# Patient Record
Sex: FEMALE | Race: WHITE | NOT HISPANIC OR LATINO | Employment: OTHER
[De-identification: names, ages, dates, MRNs, and addresses within clinical notes are randomized per-mention and may not be internally consistent; named-entity substitution may affect disease eponyms.]

---

## 2022-04-19 ENCOUNTER — TRANSCRIBE ORDERS (OUTPATIENT)
Dept: OTHER | Age: 67
End: 2022-04-19

## 2022-04-19 DIAGNOSIS — M54.50 LOW BACK PAIN, UNSPECIFIED: Primary | ICD-10-CM

## 2022-04-28 ENCOUNTER — THERAPY VISIT (OUTPATIENT)
Dept: PHYSICAL THERAPY | Facility: CLINIC | Age: 67
End: 2022-04-28
Attending: FAMILY MEDICINE
Payer: COMMERCIAL

## 2022-04-28 DIAGNOSIS — G89.29 CHRONIC MIDLINE LOW BACK PAIN WITHOUT SCIATICA: ICD-10-CM

## 2022-04-28 DIAGNOSIS — M54.50 CHRONIC MIDLINE LOW BACK PAIN WITHOUT SCIATICA: ICD-10-CM

## 2022-04-28 PROCEDURE — 97110 THERAPEUTIC EXERCISES: CPT | Mod: GP | Performed by: PHYSICAL THERAPIST

## 2022-04-28 PROCEDURE — 97161 PT EVAL LOW COMPLEX 20 MIN: CPT | Mod: GP | Performed by: PHYSICAL THERAPIST

## 2022-04-28 PROCEDURE — 97112 NEUROMUSCULAR REEDUCATION: CPT | Mod: GP | Performed by: PHYSICAL THERAPIST

## 2022-04-28 NOTE — PROGRESS NOTES
Physical Therapy Initial Evaluation  Subjective:  The history is provided by the patient. No  was used.   Patient Health History  Kaila Jimenez being seen for chronic back pain.     Problem began: 4/19/2022.   Problem occurred: unknown   Pain is reported as 3/10 on pain scale.  General health as reported by patient is fair.  Pertinent medical history includes: diabetes, implanted device and numbness/tingling.   Red flags:  None as reported by patient.  Medical allergies: none.   Surgeries include:  Orthopedic surgery. Other surgery history details: L knee TKA.    Current medications:  Other. Other medications details: diabetes meds; OTC excederine medication; neuropathy.    Current occupation is Retired.      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                Therapist Generated HPI Evaluation  Problem details: Patient presents to PT today with c/o chronic LBP; has been ongoing for many years (~ 3 years ago- [place] [employer]         Type of problem:  Lumbar.    This is a chronic condition.  Condition occurred with:  Insidious onset.  Where condition occurred: for unknown reasons.  Patient reports pain:  Lumbar spine right, lumbar spine left and central lumbar spine.  Pain is described as aching and is constant.  Pain radiates to:  No radiation. Pain is worse during the day.  Since onset symptoms are unchanged.  Associated symptoms:  Loss of motion/stiffness and loss of strength. Symptoms are exacerbated by standing, walking, other and bending (transfers)  and relieved by rest and NSAID's.  Special tests included:  X-ray (DDD?).    Barriers include:  Stairs.      Oswestry Score: 26 %                 Objective:  Standing Alignment:    Cervical/Thoracic:  Forward head  Shoulder/UE:  Rounded shoulders, protracted scapula L and protracted scapula R  Lumbar:  Lordosis decr            Gait:    Gait Type:  Antalgic   Assistive Devices:   None  Deviations:  Shoulder:  Decr arm swing R and decr arm swing LLumbar:  Trunk flexion               Lumbar/SI Evaluation  ROM:    AROM Lumbar:   Flexion:        Ankle pain  Ext:                    Mod loss   Side Bend:        Left:     Right:   Rotation:           Left:     Right:   Side Glide:        Left:     Right:                                                                          General     ROS    Assessment/Plan:    Patient is a 66 year old female with lumbar complaints.    Patient has the following significant findings with corresponding treatment plan.                Diagnosis 1:  LBP  Pain -  hot/cold therapy, US and manual therapy  Impaired muscle performance - neuro re-education  Decreased function - therapeutic activities  Impaired posture - neuro re-education    Therapy Evaluation Codes:   1) History comprised of:   Personal factors that impact the plan of care:      Past/current experiences.    Comorbidity factors that impact the plan of care are:      Diabetes, Implanted device and Numbness/tingling.     Medications impacting care: diabetes medication.  2) Examination of Body Systems comprised of:   Body structures and functions that impact the plan of care:      Lumbar spine.   Activity limitations that impact the plan of care are:      Bending, Standing and Walking.  3) Clinical presentation characteristics are:   Stable/Uncomplicated.  4) Decision-Making    Low complexity using standardized patient assessment instrument and/or measureable assessment of functional outcome.  Cumulative Therapy Evaluation is: Low complexity.    Previous and current functional limitations:  (See Goal Flow Sheet for this information)    Short term and Long term goals: (See Goal Flow Sheet for this information)     Communication ability:  Patient appears to be able to clearly communicate and understand verbal and written communication and follow directions correctly.  Treatment Explanation - The following has  been discussed with the patient:   RX ordered/plan of care  Anticipated outcomes  Possible risks and side effects  This patient would benefit from PT intervention to resume normal activities.   Rehab potential is good.    Frequency:  1 X week, once daily  Duration:  for 12 weeks  Discharge Plan:  Achieve all LTG.  Independent in home treatment program.  Reach maximal therapeutic benefit.    Please refer to the daily flowsheet for treatment today, total treatment time and time spent performing 1:1 timed codes.

## 2022-04-28 NOTE — PROGRESS NOTES
[facility]    OUTPATIENT Physical Therapy ORTHOPEDIC EVALUATION  PLAN OF TREATMENT FOR OUTPATIENT REHABILITATION  (COMPLETE FOR INITIAL CLAIMS ONLY)  Patient's Last Name, First Name, M.I.  YOB: 1955  Kaila Jimenez    Provider s Name:  [facility]   Medical Record No.  [id number]   Start of Care Date:  04/28/22   Onset Date:   04/19/22 (MD referral)   Type:     _X__PT   ___OT Medical Diagnosis:    Encounter Diagnosis   Name Primary?    Chronic midline low back pain without sciatica         Treatment Diagnosis:  LBP        Goals:     04/28/22 0500   Body Part   Goals listed below are for LBP   Goal #1   Goal #1 ambulation   Previous Functional Level No restrictions   Current Functional Level Minutes patient can walk   Performance Level 5-8 min; uses a scooter due to fatigue   STG Target Performance Minutes patient will be able to walk   Performance Level 15 min; no pain   Rationale for safe household ambulation;for safe outdoor household ambulation;for safe community ambulation   Due Date 06/09/22    LTG Target Performance Minutes patient will be able to  walk   Performance Level 30 min; no pain   Rationale for safe household ambulation;for safe outdoor household ambulation;for safe community ambulation;to promote a healthy and active lifestyle   Due Date 07/21/22       Therapy Frequency:  1x/week  Predicted Duration of Therapy Intervention:  x12 weeks    Emilia Jimenez, PT                 I CERTIFY THE NEED FOR THESE SERVICES FURNISHED UNDER        THIS PLAN OF TREATMENT AND WHILE UNDER MY CARE .             Physician Signature               Date    X_____________________________________________________                         Certification Date From:  04/28/22   Certification Date To:  07/21/22    Referring Provider:  Sary Harrington DO    Initial  Assessment        See Epic Evaluation SOC Date: 04/28/22

## 2022-05-05 ENCOUNTER — THERAPY VISIT (OUTPATIENT)
Dept: PHYSICAL THERAPY | Facility: CLINIC | Age: 67
End: 2022-05-05
Payer: COMMERCIAL

## 2022-05-05 DIAGNOSIS — G89.29 CHRONIC MIDLINE LOW BACK PAIN WITHOUT SCIATICA: Primary | ICD-10-CM

## 2022-05-05 DIAGNOSIS — M54.50 CHRONIC MIDLINE LOW BACK PAIN WITHOUT SCIATICA: Primary | ICD-10-CM

## 2022-05-05 PROCEDURE — 97140 MANUAL THERAPY 1/> REGIONS: CPT | Mod: GP | Performed by: PHYSICAL THERAPIST

## 2022-05-05 PROCEDURE — 97110 THERAPEUTIC EXERCISES: CPT | Mod: GP | Performed by: PHYSICAL THERAPIST

## 2022-05-05 PROCEDURE — 97112 NEUROMUSCULAR REEDUCATION: CPT | Mod: GP | Performed by: PHYSICAL THERAPIST

## 2022-05-08 ENCOUNTER — HEALTH MAINTENANCE LETTER (OUTPATIENT)
Age: 67
End: 2022-05-08

## 2022-05-17 ENCOUNTER — THERAPY VISIT (OUTPATIENT)
Dept: PHYSICAL THERAPY | Facility: CLINIC | Age: 67
End: 2022-05-17
Payer: COMMERCIAL

## 2022-05-17 DIAGNOSIS — G89.29 CHRONIC MIDLINE LOW BACK PAIN WITHOUT SCIATICA: Primary | ICD-10-CM

## 2022-05-17 DIAGNOSIS — M54.50 CHRONIC MIDLINE LOW BACK PAIN WITHOUT SCIATICA: Primary | ICD-10-CM

## 2022-05-17 PROCEDURE — 97110 THERAPEUTIC EXERCISES: CPT | Mod: GP | Performed by: PHYSICAL THERAPIST

## 2022-05-17 PROCEDURE — 97112 NEUROMUSCULAR REEDUCATION: CPT | Mod: GP | Performed by: PHYSICAL THERAPIST

## 2022-05-17 PROCEDURE — 97140 MANUAL THERAPY 1/> REGIONS: CPT | Mod: GP | Performed by: PHYSICAL THERAPIST

## 2022-06-02 ENCOUNTER — THERAPY VISIT (OUTPATIENT)
Dept: PHYSICAL THERAPY | Facility: CLINIC | Age: 67
End: 2022-06-02
Payer: COMMERCIAL

## 2022-06-02 DIAGNOSIS — M54.50 CHRONIC MIDLINE LOW BACK PAIN WITHOUT SCIATICA: Primary | ICD-10-CM

## 2022-06-02 DIAGNOSIS — G89.29 CHRONIC MIDLINE LOW BACK PAIN WITHOUT SCIATICA: Primary | ICD-10-CM

## 2022-06-02 PROCEDURE — 97140 MANUAL THERAPY 1/> REGIONS: CPT | Mod: GP | Performed by: PHYSICAL THERAPIST

## 2022-06-02 PROCEDURE — 97112 NEUROMUSCULAR REEDUCATION: CPT | Mod: GP | Performed by: PHYSICAL THERAPIST

## 2022-06-02 PROCEDURE — 97110 THERAPEUTIC EXERCISES: CPT | Mod: GP | Performed by: PHYSICAL THERAPIST

## 2022-06-03 ENCOUNTER — TRANSCRIBE ORDERS (OUTPATIENT)
Dept: OTHER | Age: 67
End: 2022-06-03
Payer: COMMERCIAL

## 2022-06-03 DIAGNOSIS — M54.2 NECK PAIN: Primary | ICD-10-CM

## 2022-06-16 ENCOUNTER — THERAPY VISIT (OUTPATIENT)
Dept: PHYSICAL THERAPY | Facility: CLINIC | Age: 67
End: 2022-06-16
Attending: FAMILY MEDICINE
Payer: COMMERCIAL

## 2022-06-16 DIAGNOSIS — M54.2 NECK PAIN: ICD-10-CM

## 2022-06-16 DIAGNOSIS — M54.50 CHRONIC MIDLINE LOW BACK PAIN WITHOUT SCIATICA: Primary | ICD-10-CM

## 2022-06-16 DIAGNOSIS — G89.29 CHRONIC MIDLINE LOW BACK PAIN WITHOUT SCIATICA: Primary | ICD-10-CM

## 2022-06-16 PROCEDURE — 97161 PT EVAL LOW COMPLEX 20 MIN: CPT | Mod: GP | Performed by: PHYSICAL THERAPIST

## 2022-06-16 PROCEDURE — 97140 MANUAL THERAPY 1/> REGIONS: CPT | Mod: GP | Performed by: PHYSICAL THERAPIST

## 2022-06-16 NOTE — PROGRESS NOTES
Physical Therapy Initial Evaluation  Subjective:  The history is provided by the patient. No  was used.   Patient Health History  Kaila Jimenez being seen for Neck Pain and HA.     Problem began: 6/3/2022.   Problem occurred: has been ongoing for years   Pain is reported as 1/10 on pain scale.  General health as reported by patient is good.  Pertinent medical history includes: diabetes and numbness/tingling.   Red flags:  Pain at rest/night.  Medical allergies: none.   Surgeries include:  Orthopedic surgery.    Current medications:  Pain medication and other. Other medications details: diabetes medication.    Current occupation is retired.                     Therapist Generated HPI Evaluation  Problem details: Presents to PT with c/o ongoing Neck pain and HA.  Recently returned from vacation with c/o HA  .         Type of problem:  Cervical spine.    This is a chronic condition.  Condition occurred with:  Insidious onset.  Where condition occurred: for unknown reasons.  Patient reports pain:  Central cervical spine, cervical left side and cervical right side.  Pain is described as aching and other (throbbing/pounding pain; intensity varies with activity) and is intermittent.  Pain radiates to:  Head, shoulder left and shoulder right. Pain is the same all the time.  Since onset symptoms are gradually improving.  Associated symptoms:  Loss of motion/stiffness. Symptoms are exacerbated by looking up or down, rotating head, sitting and other (bending)  and relieved by heat and other (rubbing the area).  Imaging testing: nothing recently.    Barriers include:  None as reported by patient.                        Objective:  Standing Alignment:    Cervical/Thoracic:  Forward head, Dowager's hump and thoracic kyphosis increased  Shoulder/UE:  Rounded shoulders                                  Cervical/Thoracic Evaluation    AROM:  AROM Cervical:    Flexion:          Chin to chest; pulling back of  neck  Extension:       Mod loss; some loss of balance in standing  Rotation:         Left: mod loss     Right: min loss  Side Bend:      Left:     Right:       Headaches: none  Cervical Myotomes:  normal                        Cervical Palpation:    Tenderness present at Left:    Upper Trap; Levator and Erector Spinae  Tenderness present at Right:    Upper Trap; Levator and Erector Spinae                                                  General     ROS    Assessment/Plan:    Patient is a 66 year old female with cervical complaints.    Patient has the following significant findings with corresponding treatment plan.                Diagnosis 1:  Neck Pain  Pain -  hot/cold therapy, US and manual therapy  Impaired muscle performance - neuro re-education  Decreased function - therapeutic activities  Impaired posture - neuro re-education    Therapy Evaluation Codes:   1) History comprised of:   Personal factors that impact the plan of care:      Past/current experiences.    Comorbidity factors that impact the plan of care are:      Diabetes.     Medications impacting care: Pain.  2) Examination of Body Systems comprised of:   Body structures and functions that impact the plan of care:      Cervical spine.   Activity limitations that impact the plan of care are:      Sitting.  3) Clinical presentation characteristics are:   Stable/Uncomplicated.  4) Decision-Making    Low complexity using standardized patient assessment instrument and/or measureable assessment of functional outcome.  Cumulative Therapy Evaluation is: Low complexity.    Previous and current functional limitations:  (See Goal Flow Sheet for this information)    Short term and Long term goals: (See Goal Flow Sheet for this information)     Communication ability:  Patient appears to be able to clearly communicate and understand verbal and written communication and follow directions correctly.  Treatment Explanation - The following has been discussed with the  patient:   RX ordered/plan of care  Anticipated outcomes  Possible risks and side effects  This patient would benefit from PT intervention to resume normal activities.   Rehab potential is good.    Frequency:  2 X a month, once daily  Duration:  for 3 months  Discharge Plan:  Achieve all LTG.  Independent in home treatment program.  Reach maximal therapeutic benefit.    Please refer to the daily flowsheet for treatment today, total treatment time and time spent performing 1:1 timed codes.

## 2022-06-16 NOTE — PROGRESS NOTES
[facility]    OUTPATIENT Physical Therapy ORTHOPEDIC EVALUATION  PLAN OF TREATMENT FOR OUTPATIENT REHABILITATION  (COMPLETE FOR INITIAL CLAIMS ONLY)  Patient's Last Name, First Name, M.I.  YOB: 1955  Kaila Jimenez    Provider s Name:  [facility]   Medical Record No.  [id number]   Start of Care Date:  06/16/22   Onset Date:   06/03/22 (MD referral)   Type:     _X__PT   ___OT Medical Diagnosis:    Encounter Diagnoses   Name Primary?    Chronic midline low back pain without sciatica Yes    Neck pain         Treatment Diagnosis:  LBP        Goals:     06/16/22 0500   Goal #2   Goal #2 reading/computer work   Previous Functional Level No restrictions   Current Functional Level Minutes patient can read;Minutes patient can work on a computer   Performance level 4/10PL; 30-60'   STG Target Performance Minutes patient will be able to read;Minutes patient will be able to work on a computer   Performance level 2/10PL; 30-60'   Rationale to perform home related tasks ;to return to normal leisure activities   Due date 07/21/22   LTG Target Performance Hours patient will be able to read;Hours patient will be able to work on a computer   Performance Level >1 hour; no pain   Rationale to perform home related tasks;to return to normal leisure activities   Due date 09/16/22       Therapy Frequency:  1x/week  Predicted Duration of Therapy Intervention:  x8 weeks    Emilia Jimenez, PT                 I CERTIFY THE NEED FOR THESE SERVICES FURNISHED UNDER        THIS PLAN OF TREATMENT AND WHILE UNDER MY CARE .             Physician Signature               Date    X_____________________________________________________                         Certification Date From:  06/16/22   Certification Date To:  09/16/22    Referring Provider:  Sary Harrington, DO    Initial Assessment        See  Epic Evaluation SOC Date: 06/16/22

## 2022-07-05 ENCOUNTER — THERAPY VISIT (OUTPATIENT)
Dept: PHYSICAL THERAPY | Facility: CLINIC | Age: 67
End: 2022-07-05
Payer: COMMERCIAL

## 2022-07-05 DIAGNOSIS — M54.50 CHRONIC MIDLINE LOW BACK PAIN WITHOUT SCIATICA: ICD-10-CM

## 2022-07-05 DIAGNOSIS — M54.2 NECK PAIN: Primary | ICD-10-CM

## 2022-07-05 DIAGNOSIS — G89.29 CHRONIC MIDLINE LOW BACK PAIN WITHOUT SCIATICA: ICD-10-CM

## 2022-07-05 PROCEDURE — 97035 APP MDLTY 1+ULTRASOUND EA 15: CPT | Mod: GP | Performed by: PHYSICAL THERAPIST

## 2022-07-05 PROCEDURE — 97110 THERAPEUTIC EXERCISES: CPT | Mod: GP | Performed by: PHYSICAL THERAPIST

## 2022-07-05 PROCEDURE — 97140 MANUAL THERAPY 1/> REGIONS: CPT | Mod: GP | Performed by: PHYSICAL THERAPIST

## 2022-07-19 ENCOUNTER — THERAPY VISIT (OUTPATIENT)
Dept: PHYSICAL THERAPY | Facility: CLINIC | Age: 67
End: 2022-07-19
Payer: COMMERCIAL

## 2022-07-19 DIAGNOSIS — M54.2 NECK PAIN: Primary | ICD-10-CM

## 2022-07-19 DIAGNOSIS — G89.29 CHRONIC MIDLINE LOW BACK PAIN WITHOUT SCIATICA: ICD-10-CM

## 2022-07-19 DIAGNOSIS — M54.50 CHRONIC MIDLINE LOW BACK PAIN WITHOUT SCIATICA: ICD-10-CM

## 2022-07-19 PROCEDURE — 97140 MANUAL THERAPY 1/> REGIONS: CPT | Mod: GP | Performed by: PHYSICAL THERAPIST

## 2022-07-19 PROCEDURE — 97110 THERAPEUTIC EXERCISES: CPT | Mod: GP | Performed by: PHYSICAL THERAPIST

## 2022-07-19 PROCEDURE — 97112 NEUROMUSCULAR REEDUCATION: CPT | Mod: GP | Performed by: PHYSICAL THERAPIST

## 2022-07-19 NOTE — PROGRESS NOTES
Subjective:  HPI  Physical Exam                    Objective:  System    Physical Exam    General     ROS    Assessment/Plan:    DISCHARGE REPORT for LBP    Progress reporting period is from 4/28/2022 to 7/19/2022.       SUBJECTIVE  Subjective changes noted by patient:   Patient stated she has minimal pain today in both the LB and neck region.  Patient feels the back is doing well enough she can be done with back treatment and will continue with neck treatment    Current pain level is : 3/10 (LB;  1/10PL Neck).     Previous pain level was  : 3/10.   Changes in function:  Yes (See Goal flowsheet attached for changes in current functional level)  Adverse reaction to treatment or activity: None    OBJECTIVE  Changes noted in objective findings:    TROM: flex= lower leg, Ext= min loss.  Standign posture: FWD head, rounded shoulders, increase Lumbar lordosis in standing.     ASSESSMENT/PLAN  Updated problem list and treatment plan: Diagnosis 1:  LBP  Pain -  hot/cold therapy  STG/LTGs have been met or progress has been made towards goals:  Yes (See Goal flow sheet completed today.)  Assessment of Progress: The patient's condition is improving.  The patient has met all of their long term goals.  Self Management Plans:  Patient has been instructed in a home treatment program.  Patient is independent in a home treatment program.    Kaila continues to require the following intervention to meet STG and LTG's:  PT intervention is no longer required to meet STG/LTG.    Recommendations:  This patient is ready to be discharged from therapy for LBP and continue their home treatment program.  Will continue with treatment of the Cervical Spine    Please refer to the daily flowsheet for treatment today, total treatment time and time spent performing 1:1 timed codes.

## 2022-08-02 ENCOUNTER — THERAPY VISIT (OUTPATIENT)
Dept: PHYSICAL THERAPY | Facility: CLINIC | Age: 67
End: 2022-08-02
Payer: COMMERCIAL

## 2022-08-02 DIAGNOSIS — M54.2 NECK PAIN: Primary | ICD-10-CM

## 2022-08-02 PROCEDURE — 97140 MANUAL THERAPY 1/> REGIONS: CPT | Mod: GP | Performed by: PHYSICAL THERAPIST

## 2022-08-02 PROCEDURE — 97035 APP MDLTY 1+ULTRASOUND EA 15: CPT | Mod: GP | Performed by: PHYSICAL THERAPIST

## 2022-08-03 NOTE — PROGRESS NOTES
[facility]    OUTPATIENT Physical Therapy ORTHOPEDIC EVALUATION  PLAN OF TREATMENT FOR OUTPATIENT REHABILITATION  (COMPLETE FOR INITIAL CLAIMS ONLY)  Patient's Last Name, First Name, M.I.  YOB: 1955  Kaila Jimenez    Provider s Name:  [facility]   Medical Record No.  [id number]   Start of Care Date:  04/28/22 (6/16/2022- SOC date for neck)   Onset Date:   04/19/22 (MD referral)   Type:     _X__PT   ___OT Medical Diagnosis:    Encounter Diagnosis   Name Primary?    Neck pain Yes        Treatment Diagnosis:           Goals:     08/02/22 0500   Body Part   Goals listed below are for Neck   Goal #2   Goal #2 reading/computer work   Previous Functional Level No restrictions   Current Functional Level Minutes patient can read;Minutes patient can work on a computer   Performance level 4-5/10PL when working [place] paperwork [place] [employer]   STG Target Performance Minutes patient will be able to read;Minutes patient will be able to work on a computer   Performance level 2-3/10PL; 30-60'   Rationale to perform home related tasks ;to return to normal leisure activities   Due date 09/02/22   LTG Target Performance Hours patient will be able to read;Hours patient will be able to work on a computer   Performance Level <2/10 PL when working on computer   Rationale to perform home related tasks;to return to normal leisure activities   Due date 10/22/22   Goal #3   Goal #3 driving/transportation   Previous Functional Level No restrictions   Current Functional Level Unable to rotate neck to look over shoulders   Performance Level 6/10pl   STG Target Performance Able to look over either shoulder    Performance Level 2/10pl   Rationale for safe driving   Due date 09/02/22   LTG Target Performance Able to look over either shoulder    Performance Level no pain   Rationale for safe driving    Due date 10/22/22       Therapy Frequency:  2x/month  Predicted Duration of Therapy Intervention:  x3 months    Emilia Jimenez, PT                 I CERTIFY THE NEED FOR THESE SERVICES FURNISHED UNDER        THIS PLAN OF TREATMENT AND WHILE UNDER MY CARE .             Physician Signature               Date    X_____________________________________________________                         Certification Date From:  07/22/22 (6/16/2022- Start of Cert date for neck)   Certification Date To:  10/22/22 (9/16/2022- End of Cert period for neck)    Referring Provider:  Sary Harrington, DO    Initial Assessment        See Epic Evaluation SOC Date: 04/28/22 (6/16/2022- SOC date for neck)

## 2022-09-27 ENCOUNTER — THERAPY VISIT (OUTPATIENT)
Dept: PHYSICAL THERAPY | Facility: CLINIC | Age: 67
End: 2022-09-27
Payer: COMMERCIAL

## 2022-09-27 DIAGNOSIS — M54.2 NECK PAIN: Primary | ICD-10-CM

## 2022-09-27 PROCEDURE — 97110 THERAPEUTIC EXERCISES: CPT | Mod: GP | Performed by: PHYSICAL THERAPIST

## 2022-09-27 PROCEDURE — 97140 MANUAL THERAPY 1/> REGIONS: CPT | Mod: GP | Performed by: PHYSICAL THERAPIST

## 2022-09-27 NOTE — PROGRESS NOTES
Subjective:  HPI  Physical Exam                    Objective:  System    Physical Exam    General     ROS    Assessment/Plan:    PROGRESS  REPORT    Progress reporting period is from 8/2/2022 to 9/27/2022.       SUBJECTIVE  Subjective changes noted by patient:   Patient stated the neck pain is better than when she was seen prior [place]. Currently has c/o constant tightness and intermittent R side pain.  Denies R UE tingling or numbness. Pt currently has no issues with driving, sleeping or sitting to work on computer or watch TV.  Patient states her LB is bothering her more recently; c/o constant soreness with prolong standing and walking. Difficulty with some don/doffing of shoes and socks when the back is bad.    Current pain level is : 1/10 (neck;  3-4/10PL LB region).     Previous pain level was  : 3/10.   Changes in function:  Yes, progressing  Adverse reaction to treatment or activity: activity - increase soreness    OBJECTIVE  Changes noted in objective findings:    Posture: FWD Head, rounded shoulders, dowenger hump and increast throacic kyphosis.    Patricio shoudler aROM is limited but equal bilaterally.    CROM: flex= chin to chest- pulling R side of neck, Ext- WNL; pulling R side of neck (some dizziness when doing it in standing), R ROT= mod loss; pulling R side, L ROT= WNL. R SB= WNL; L SB= mod loss; pulling R side.   Trunk ROM: Flex= lower leg; pulling LB,  Ext= mod loss (pain), lateral SB= WNL; pain going to R side.  Palpation: tenderness noted over the R Lev Scap at attachment sites and mid muscle.  Tenderness noted over lumbar paraspinals.     ASSESSMENT/PLAN  Updated problem list and treatment plan: Diagnosis 1:  Neck pain  Pain -  hot/cold therapy, US and manual therapy  Impaired muscle performance - neuro re-education  Decreased function - therapeutic activities  Impaired posture - neuro re-education  STG/LTGs have been met or progress has been made towards goals:  Yes (See Goal flow sheet  completed today.)  Assessment of Progress: The patient's condition has potential to improve regarding cervical spine.  The patient's condition has exacerbated- LBP  Self Management Plans:  Patient has been instructed in a home treatment program.  I have re-evaluated this patient and find that the nature, scope, duration and intensity of the therapy is appropriate for the medical condition of the patient.  Kaila continues to require the following intervention to meet STG and LTG's:  PT    Recommendations:  This patient would benefit from continued therapy.     Frequency:  1   X week, once daily  Duration:  for 8 weeks  PT orders being sent to MD to allow evaluation and treatment of the LBP due to exacerbation.        Please refer to the daily flowsheet for treatment today, total treatment time and time spent performing 1:1 timed codes.

## 2022-09-27 NOTE — LETTER
[facility]  [address] [address]  [phone]    2022    Re: Kaila Jimenez   :   1955  MRN:  [id number]   REFERRING PHYSICIAN:   DO [facility]  Date of Initial Evaluation: 2022   Visits:     Reason for Referral:  Neck pain    PROGRESS  REPORT  Progress reporting period is from 2022 to 2022.       SUBJECTIVE  Subjective changes noted by patient:   Patient stated the neck pain is better than when she was seen [place]. Currently has c/o constant tightness and intermittent R side pain.  Denies R UE tingling or numbness. Pt currently has no issues with driving, sleeping or sitting to work on computer or watch TV.  Patient states her LB is bothering her more recently; c/o constant soreness with prolong standing and walking. Difficulty with some don/doffing of shoes and socks when the back is bad.    Current pain level is : 1/10 (neck;  3-4/10PL LB region).     Previous pain level was  : 3/10.   Changes in function:  Yes, progressing  Adverse reaction to treatment or activity: activity - increase soreness    OBJECTIVE  Changes noted in objective findings:    Posture: FWD Head, rounded shoulders, dowenger hump and increast throacic kyphosis.    Patricio shoudler aROM is limited but equal bilaterally.    CROM: flex= chin to chest- pulling R side of neck, Ext- WNL; pulling R side of neck (some dizziness when doing it in standing), R ROT= mod loss; pulling R side, L ROT= WNL. R SB= WNL; L SB= mod loss; pulling R side.   Trunk ROM: Flex= lower leg; pulling LB,  Ext= mod loss (pain), lateral SB= WNL; pain going to R side.  Palpation: tenderness noted over the R Lev Scap at attachment sites and mid muscle.  Tenderness noted over lumbar paraspinals.     ASSESSMENT/PLAN  Updated problem list and treatment plan: Diagnosis 1:  Neck pain  Pain -   hot/cold therapy, US and manual therapy  Impaired muscle performance - neuro re-education  Decreased function - therapeutic activities  Impaired posture - neuro re-education  STG/LTGs have been met or progress has been made towards goals:  Yes (See Goal flow sheet completed today.)  Assessment of Progress: The patient's condition has potential to improve regarding   Re: Kaila Jimenez   :   1955    cervical spine.  The patient's condition has exacerbated- LBP  Self Management Plans:  Patient has been instructed in a home treatment program.  I have re-evaluated this patient and find that the nature, scope, duration and intensity of the therapy is appropriate for the medical condition of the patient.  Kaila continues to require the following intervention to meet STG and LTG's:  PT    Recommendations:  This patient would benefit from continued therapy.     Frequency:  1   X week, once daily  Duration:  for 8 weeks  PT orders being sent to MD to allow evaluation and treatment of the LBP due to exacerbation.      Please refer to the daily flowsheet for treatment today, total treatment time and time spent performing 1:1 timed codes.      Thank you for your referral.    INQUIRIES  Therapist: Emilia Jimenez [facility] [address] [address]  Phone: [phone]  Fax: [phone]

## 2022-09-29 ENCOUNTER — TRANSCRIBE ORDERS (OUTPATIENT)
Dept: OTHER | Age: 67
End: 2022-09-29

## 2022-09-29 DIAGNOSIS — M54.9 BACK PAIN: ICD-10-CM

## 2022-09-29 DIAGNOSIS — M54.2 NECK PAIN: Primary | ICD-10-CM

## 2022-10-06 ENCOUNTER — THERAPY VISIT (OUTPATIENT)
Dept: PHYSICAL THERAPY | Facility: CLINIC | Age: 67
End: 2022-10-06
Payer: COMMERCIAL

## 2022-10-06 DIAGNOSIS — M54.50 CHRONIC MIDLINE LOW BACK PAIN WITHOUT SCIATICA: ICD-10-CM

## 2022-10-06 DIAGNOSIS — M54.9 BACK PAIN: ICD-10-CM

## 2022-10-06 DIAGNOSIS — G89.29 CHRONIC MIDLINE LOW BACK PAIN WITHOUT SCIATICA: ICD-10-CM

## 2022-10-06 PROCEDURE — 97140 MANUAL THERAPY 1/> REGIONS: CPT | Mod: GP | Performed by: PHYSICAL THERAPIST

## 2022-10-06 PROCEDURE — 97161 PT EVAL LOW COMPLEX 20 MIN: CPT | Mod: GP | Performed by: PHYSICAL THERAPIST

## 2022-10-06 PROCEDURE — 97110 THERAPEUTIC EXERCISES: CPT | Mod: GP | Performed by: PHYSICAL THERAPIST

## 2022-10-06 NOTE — PROGRESS NOTES
Physical Therapy Initial Evaluation  Subjective:  The history is provided by the patient. No  was used.   Patient Health History  Kaila Jimenez being seen for LBP.     Problem began: 9/29/2022 (MD order).   Problem occurred: age   Pain is reported as 3/10 on pain scale.  General health as reported by patient is good.  Pertinent medical history includes: diabetes.     Medical allergies: none.   Surgeries include:  None.    Current medications:  Other. Other medications details: diabetes.    Current occupation is retired.                     Therapist Generated HPI Evaluation  Problem details: Patient presents to PT today with c/o chronic back pain.  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  Patient recently was seen by PT for LBP and was doing well.  Currently being seen by PT for Neck pain .         Type of problem:  Lumbar.    This is a chronic condition.  Condition occurred with:  Insidious onset.  Where condition occurred: for unknown reasons.  Patient reports pain:  Lumbar spine right and lumbar spine left.  Pain is described as aching (dull achy pain) and is constant.  Pain is the same all the time.  Since onset symptoms are unchanged.  Associated symptoms:  Loss of motion/stiffness. Symptoms are exacerbated by standing, walking and other (transfers)  and relieved by rest and activity/movement.    Previous treatment includes physical therapy. There was moderate improvement following previous treatment.  Barriers include:  None as reported by patient.                        Objective:  Standing Alignment:    Cervical/Thoracic:  Forward head, Dowager's hump and thoracic kyphosis increased  Shoulder/UE:  Rounded shoulders, protracted scapula L and protracted scapula R  Lumbar:  Lordosis decr                           Lumbar/SI Evaluation  ROM:    AROM  Lumbar:   Flexion:          Lower legs  Ext:                    Mod loss   Side Bend:        Left:  WNL    Right:  WNL  Rotation:           Left:     Right:   Side Glide:        Left:     Right:           Lumbar Myotomes:  normal                    Lumbar Palpation:    Tenderness present at Left:    Quadratus Lumborum and Erector Spinae  Tenderness present at Right: Quadratus Lumborum and Erector Spinae                                                     General     ROS    Assessment/Plan:    Patient is a 67 year old female with lumbar complaints.    Patient has the following significant findings with corresponding treatment plan.                Diagnosis 1:  LBP  Pain -  hot/cold therapy, US and manual therapy  Decreased ROM/flexibility - manual therapy and therapeutic exercise  Impaired muscle performance - neuro re-education  Decreased function - therapeutic activities  Impaired posture - neuro re-education    Therapy Evaluation Codes:   1) History comprised of:   Personal factors that impact the plan of care:      Age and Past/current experiences.    Comorbidity factors that impact the plan of care are:      Diabetes.     Medications impacting care: diabetes medication.  2) Examination of Body Systems comprised of:   Body structures and functions that impact the plan of care:      Lumbar spine.   Activity limitations that impact the plan of care are:      Bending, Lifting, Walking and transfers.  3) Clinical presentation characteristics are:   Stable/Uncomplicated.  4) Decision-Making    Low complexity using standardized patient assessment instrument and/or measureable assessment of functional outcome.  Cumulative Therapy Evaluation is: Low complexity.    Previous and current functional limitations:  (See Goal Flow Sheet for this information)    Short term and Long term goals: (See Goal Flow Sheet for this information)     Communication ability:  Patient appears to be able to clearly communicate and understand  verbal and written communication and follow directions correctly.  Treatment Explanation - The following has been discussed with the patient:   RX ordered/plan of care  Anticipated outcomes  Possible risks and side effects  This patient would benefit from PT intervention to resume normal activities.   Rehab potential is good.    Frequency:  1 X week, once daily  Duration:  for 8 weeks  Discharge Plan:  Achieve all LTG.  Independent in home treatment program.  Reach maximal therapeutic benefit.    Please refer to the daily flowsheet for treatment today, total treatment time and time spent performing 1:1 timed codes.

## 2022-10-06 NOTE — PROGRESS NOTES
[facility]    OUTPATIENT Physical Therapy ORTHOPEDIC EVALUATION  PLAN OF TREATMENT FOR OUTPATIENT REHABILITATION  (COMPLETE FOR INITIAL CLAIMS ONLY)  Patient's Last Name, First Name, M.I.  YOB: 1955  Kaila Jimenez    Provider s Name:  [facility]   Medical Record No.  [id number]   Start of Care Date:  10/06/22   Onset Date:   09/29/22 (MD order for LBP)   Treatment Diagnosis:  LBP Medical Diagnosis:     Neck pain  Back pain       Goals:     10/06/22 0500   Goal #1   Goal #1 ambulation   Previous Functional Level Minutes patient could walk   Performance Level 30 min   Current Functional Level Minutes patient can walk   Performance Level maybe 5 min without external support; 10-15 min with a grocery cart   STG Target Performance Minutes patient will be able to walk   Performance Level 30 min with a grocery cart for support   Rationale for safe outdoor household ambulation;for safe community ambulation   Due Date 11/03/22    LTG Target Performance Minutes patient will be able to  walk   Performance Level 30 min with no support   Rationale for safe household ambulation;for safe outdoor household ambulation;for safe community ambulation   Due Date 12/01/22       Therapy Frequency:  1x/week  Predicted Duration of Therapy Intervention:  x8 weeks    Emilia Jimenez, PT                 I CERTIFY THE NEED FOR THESE SERVICES FURNISHED UNDER        THIS PLAN OF TREATMENT AND WHILE UNDER MY CARE .             Physician Signature               Date    X_____________________________________________________                         Certification Date From:  10/06/22   Certification Date To:  12/01/22    Referring Provider:  Sary Harrington, DO    Initial Assessment        See Epic Evaluation SOC Date: 10/06/22

## 2022-10-24 ENCOUNTER — THERAPY VISIT (OUTPATIENT)
Dept: PHYSICAL THERAPY | Facility: CLINIC | Age: 67
End: 2022-10-24
Payer: COMMERCIAL

## 2022-10-24 DIAGNOSIS — G89.29 CHRONIC MIDLINE LOW BACK PAIN WITHOUT SCIATICA: Primary | ICD-10-CM

## 2022-10-24 DIAGNOSIS — M54.2 NECK PAIN: ICD-10-CM

## 2022-10-24 DIAGNOSIS — M54.50 CHRONIC MIDLINE LOW BACK PAIN WITHOUT SCIATICA: Primary | ICD-10-CM

## 2022-10-24 PROCEDURE — 97035 APP MDLTY 1+ULTRASOUND EA 15: CPT | Mod: GP | Performed by: PHYSICAL THERAPIST

## 2022-10-24 PROCEDURE — 97140 MANUAL THERAPY 1/> REGIONS: CPT | Mod: GP | Performed by: PHYSICAL THERAPIST

## 2022-10-24 PROCEDURE — 97110 THERAPEUTIC EXERCISES: CPT | Mod: GP | Performed by: PHYSICAL THERAPIST

## 2022-10-24 NOTE — PROGRESS NOTES
Subjective:  HPI  Physical Exam                    Objective:  System    Physical Exam    General     ROS    Assessment/Plan:    PROGRESS  REPORT    Progress reporting period is from 9/27/2022 to 10/24/2022.       SUBJECTIVE  Subjective changes noted by patient:  Currently has c/o increase neck and upper back pain; pt has been doing lots of work around the house and driving to and from [place]. Patient states the neck pain is achy and travels up into the back of her head causing HA.    Current pain level is 6-8/10.     Previous pain level was  3/10.   Changes in function:  None  Adverse reaction to treatment or activity: activity - increase pain    OBJECTIVE  Changes noted in objective findings:    Palpation: tenderness and tightness Patricio UT/ Lev scap  Posture: FWD head and rounded shoulders  CROM: ROT limited bilaterally due to tightness.  Shoulder AROM limited by tightness across both shoulders        ASSESSMENT/PLAN  Updated problem list and treatment plan: Diagnosis 1:  Neck pain  Pain -  hot/cold therapy and manual therapy  Decreased ROM/flexibility - manual therapy, therapeutic exercise and therapeutic activity  Impaired muscle performance - neuro re-education  Decreased function - therapeutic activities  Impaired posture - neuro re-education  STG/LTGs have been met or progress has been made towards goals:  None  Assessment of Progress: The patient's progress has slowed.  The patient has had set backs in their progress.  The patient's condition has exacerbated.  Self Management Plans:  Patient has been instructed in a home treatment program.  I have re-evaluated this patient and find that the nature, scope, duration and intensity of the therapy is appropriate for the medical condition of the patient.  Kaila continues to require the following intervention to meet STG and LTG's:  PT    Recommendations:  This patient would benefit from continued therapy.     Frequency:  1 X week, once daily  Duration:  for 6  weeks        Please refer to the daily flowsheet for treatment today, total treatment time and time spent performing 1:1 timed codes.

## 2022-10-24 NOTE — PROGRESS NOTES
[facility]    OUTPATIENT Physical Therapy ORTHOPEDIC EVALUATION  PLAN OF TREATMENT FOR OUTPATIENT REHABILITATION  (COMPLETE FOR INITIAL CLAIMS ONLY)  Patient's Last Name, First Name, M.I.  YOB: 1955  Kaila Jimenez    Provider s Name:  [facility]   Medical Record No.  [id number]   Start of Care Date:  6/16/2022   Onset Date:  4/19/2022   Treatment Diagnosis:  Neck pain Medical Diagnosis:  Neck Pain       Goals:     10/24/22 0500   Goal #3   Goal #3 driving/transportation   Previous Functional Level No restrictions   Current Functional Level Drives using side and rearview mirrors   Performance Level 4/10PL today   STG Target Performance Able to look over either shoulder    Performance Level 2/10pl   Rationale for safe driving   Due date 11/14/22   If goal not met, Why? exacerbation of pain   LTG Target Performance Able to look over either shoulder    Performance Level no pain   Rationale for safe driving   Due date 12/04/22       Therapy Frequency:  1x/week  Predicted Duration of Therapy Intervention:  x6 week    Emilia Jimenez, PT                 I CERTIFY THE NEED FOR THESE SERVICES FURNISHED UNDER        THIS PLAN OF TREATMENT AND WHILE UNDER MY CARE .             Physician Signature               Date    X_____________________________________________________                         Certification Date From:  10/23/2022   Certification Date To:  12/4/2022    Referring Provider:  Sary Harrington    Initial Assessment        See Epic Evaluation SOC Date: 10/06/22

## 2022-11-03 ENCOUNTER — THERAPY VISIT (OUTPATIENT)
Dept: PHYSICAL THERAPY | Facility: CLINIC | Age: 67
End: 2022-11-03
Payer: COMMERCIAL

## 2022-11-03 DIAGNOSIS — M54.50 CHRONIC MIDLINE LOW BACK PAIN WITHOUT SCIATICA: Primary | ICD-10-CM

## 2022-11-03 DIAGNOSIS — M54.2 NECK PAIN: ICD-10-CM

## 2022-11-03 DIAGNOSIS — G89.29 CHRONIC MIDLINE LOW BACK PAIN WITHOUT SCIATICA: Primary | ICD-10-CM

## 2022-11-03 PROCEDURE — 97140 MANUAL THERAPY 1/> REGIONS: CPT | Mod: GP | Performed by: PHYSICAL THERAPIST

## 2022-11-03 PROCEDURE — 97110 THERAPEUTIC EXERCISES: CPT | Mod: GP | Performed by: PHYSICAL THERAPIST

## 2022-12-29 ENCOUNTER — THERAPY VISIT (OUTPATIENT)
Dept: PHYSICAL THERAPY | Facility: CLINIC | Age: 67
End: 2022-12-29
Payer: COMMERCIAL

## 2022-12-29 DIAGNOSIS — M54.50 CHRONIC MIDLINE LOW BACK PAIN WITHOUT SCIATICA: Primary | ICD-10-CM

## 2022-12-29 DIAGNOSIS — M54.2 NECK PAIN: ICD-10-CM

## 2022-12-29 DIAGNOSIS — G89.29 CHRONIC MIDLINE LOW BACK PAIN WITHOUT SCIATICA: Primary | ICD-10-CM

## 2022-12-29 PROCEDURE — 97140 MANUAL THERAPY 1/> REGIONS: CPT | Mod: GP | Performed by: PHYSICAL THERAPIST

## 2022-12-30 NOTE — PROGRESS NOTES
[facility]    OUTPATIENT Physical Therapy ORTHOPEDIC EVALUATION  PLAN OF TREATMENT FOR OUTPATIENT REHABILITATION  (COMPLETE FOR INITIAL CLAIMS ONLY)  Patient's Last Name, First Name, M.I.  YOB: 1955  Kaila Jimenez    Provider s Name:  [facility]   Medical Record No.  [id number]   Start of Care Date:  10/06/22 LBP/ 4/28/2022 Neck pain   Onset Date:   09/29/22 (MD order for LBP; 4/19/2022 MD order for neck pain)   ** Merged Certification together**   Treatment Diagnosis:  LBP and neck pain Medical Diagnosis:     Chronic midline low back pain without sciatica  Neck pain       Goals:     12/29/22 0550   Goal #1   Goal #1 ambulation   Previous Functional Level Minutes patient could walk   Performance Level 30 min   Current Functional Level Minutes patient can walk   Performance Level 20-30 min with support; maybe 2-3 min without support  (pt usually walks with a cane or uses a store cart if needing to be up and on her feet longer due to knee and back issues.)   STG Target Performance Minutes patient will be able to walk   Performance Level 5-8 min no support of SEC   Rationale for safe outdoor household ambulation;for safe community ambulation   Due Date 02/02/23    LTG Target Performance Minutes patient will be able to  walk   Performance Level 10-15 min with no support   Rationale for safe household ambulation;for safe outdoor household ambulation;for safe community ambulation   Due Date 03/02/23   If goal not met, Why? goal adjusted and extended   Goal #3   Goal #3 driving/transportation   Previous Functional Level No restrictions   Current Functional Level Drives using side and rearview mirrors   Performance Level 0-1/10PL today;  2-4/10 PL if needing to turn her head and look into blind spot (turning to the R)   STG Target Performance Able to look over either  shoulder    Performance Level 2/10pl; turning to the R   Rationale for safe driving   Due date 02/02/23   LTG Target Performance Able to look over either shoulder    Performance Level no pain turning either direction   Rationale for safe driving   Due date 03/02/23   If goal not met, Why? goal adjusted and extended       Therapy Frequency:  2x/month  Predicted Duration of Therapy Intervention:  x3 months    Emilia Jimenez, PT                 I CERTIFY THE NEED FOR THESE SERVICES FURNISHED UNDER        THIS PLAN OF TREATMENT AND WHILE UNDER MY CARE .             Physician Signature               Date    X_____________________________________________________                         Certification Date From:  12/02/22   Certification Date To:  03/02/23    Referring Provider:  Sary Harrington, DO    Initial Assessment        See Epic Evaluation SOC Date: 10/06/22

## 2022-12-30 NOTE — PROGRESS NOTES
Subjective:  HPI  Physical Exam  Oswestry Score: 12 %                 Objective:  System    Physical Exam    General     ROS    Assessment/Plan:    PROGRESS  REPORT    Progress reporting period is from 10/24/2022 to 12/29/2022.       SUBJECTIVE  Subjective changes noted by patient:  Patients PT treatment interrupted due to weather and holiday with family.  Patient reports currently has no neck pain and minimal LB pain but this is a good day.  One week ago she had elevated neck and back pain after being on her feet for prolong period of time. Patient stated she was able to walk into the store and pick something up quickly without use of a cart but if she needs to walk or stand for more than 5 minutes she needs to hold onto something due to increasing back and leg pain/weakness.  Patient stated her neck has been better lately as well but if she needs to turn her head to check blind spot when driving or sit with her head turned to talk to someone on her R she has neck pain.  Patient continues to do her HEP but with the holidays she has been very busy and has not done many of them.        Current pain level is : 1/10 Neck and LBP sitting here.     Previous pain level was   3/10.   Changes in function:  Yes (See Goal flowsheet attached for changes in current functional level)  Adverse reaction to treatment or activity: activity - increase pain, weakness and fatigue with prolong activity    OBJECTIVE  Changes noted in objective findings:    Neck:  *Upper Back Posture: FWD Head, rounded shoulders, decrease in cervical lordosis, and visible dowanger hump.  *CROM:    Flex: chin to chest; some pulling back of neck   Ext: min loss   ROT: Min loss; pain reported with R ROT    *Patricio UE ROM is limited but equal bilaterally.    Low Back:  *LB Posture in standing: decrease in lumber lordosis     *TROM: Flex= lower leg, SB= mid thigh, Ext= mod loss.    *LE strength is okay 4/5.     ASSESSMENT/PLAN  Updated problem list and treatment  plan: Diagnosis 1:  Neck pain  Pain -  hot/cold therapy, US and manual therapy  Impaired muscle performance - neuro re-education  Decreased function - therapeutic activities  Impaired posture - neuro re-education  Diagnosis 2:  LBP   Pain -  hot/cold therapy, US and manual therapy  Decreased ROM/flexibility - manual therapy and therapeutic exercise  Decreased strength - therapeutic exercise and therapeutic activities  Impaired muscle performance - neuro re-education  Decreased function - therapeutic activities  Impaired posture - neuro re-education  STG/LTGs have been met or progress has been made towards goals:  Yes, goals adjusted to reflect current status  Assessment of Progress: The patient's condition is improving.  The patient's condition has potential to improve.  Self Management Plans:  Patient has been instructed in a home treatment program.  I have re-evaluated this patient and find that the nature, scope, duration and intensity of the therapy is appropriate for the medical condition of the patient.  Kaila continues to require the following intervention to meet STG and LTG's:  PT    Recommendations:  This patient would benefit from continued therapy.     Frequency:  2 X a month, once daily  Duration:  for 3 months        Please refer to the daily flowsheet for treatment today, total treatment time and time spent performing 1:1 timed codes.

## 2023-02-02 ENCOUNTER — THERAPY VISIT (OUTPATIENT)
Dept: PHYSICAL THERAPY | Facility: CLINIC | Age: 68
End: 2023-02-02
Payer: COMMERCIAL

## 2023-02-02 DIAGNOSIS — M54.2 NECK PAIN: ICD-10-CM

## 2023-02-02 DIAGNOSIS — M54.50 CHRONIC MIDLINE LOW BACK PAIN WITHOUT SCIATICA: Primary | ICD-10-CM

## 2023-02-02 DIAGNOSIS — G89.29 CHRONIC MIDLINE LOW BACK PAIN WITHOUT SCIATICA: Primary | ICD-10-CM

## 2023-02-02 PROCEDURE — 97110 THERAPEUTIC EXERCISES: CPT | Mod: GP | Performed by: PHYSICAL THERAPIST

## 2023-02-02 PROCEDURE — 97140 MANUAL THERAPY 1/> REGIONS: CPT | Mod: GP | Performed by: PHYSICAL THERAPIST

## 2023-02-02 NOTE — LETTER
[facility]  [address] [address] [address]  [phone]    February 3, 2023    Re: Kaila Jimenez   :   1955  MRN:  [id number]   REFERRING PHYSICIAN:   DO [facility]  Date of Initial Evaluation:  10/06/2022  Visits:  Rxs Used: 5  Reason for Referral:     Chronic midline low back pain without sciatica  Neck pain      PROGRESS  REPORT  Progress reporting period is from 2023 to 2023.       SUBJECTIVE  Subjective changes noted by patient:  Patient had to cancel last scheduled appointment due to weather and unable to be seen until today. Patient states the LBP is about the same; intermittent achiness and no leg pain.  Patient reports the neck pain has been off and on. currently has increase neck pain.  Currently has a HA.    Current pain level is 3/10 (LBP;  6/10PL neck).     Previous pain level was   3/10.   Changes in function:  Yes (See Goal flowsheet attached for changes in current functional level)  Adverse reaction to treatment or activity: None    OBJECTIVE  Changes noted in objective findings:    Posture: FWD head and rounded shoulders.    CROM: flex- no loss; some pulling back and R side of neck,  Ext-WNL; no issues.  ROT- R rot mod loss; pain.    TROM:  flex= touches toes; tightness back of legs and low back, ext= mod loss pain.     ASSESSMENT/PLAN  Updated problem list and treatment plan: Diagnosis 1:  Chronic neck and low back pain  Pain -  hot/cold therapy, US and manual therapy  Decreased ROM/flexibility - manual therapy and therapeutic exercise  Impaired muscle performance - neuro re-education  Decreased function - therapeutic activities  Impaired posture - neuro re-education  STG/LTGs have been met or progress has been made towards goals:  Yes (See Goal flow sheet completed today.)  Assessment of Progress: The patient's condition is improving.  The  patient's condition has potential to improve.  Self Management Plans:  Patient has been instructed in a home treatment program.  I have re-evaluated this patient and find that the nature, scope, duration and intensity of the therapy is appropriate for the medical condition of the patient.  Re: Kaila Jimenez   :   1955    Kaila continues to require the following intervention to meet STG and LTG's:  PT    Recommendations:  This patient would benefit from continued therapy.     Frequency:  2 X a month, once daily  Duration:  for 1 months        Please refer to the daily flowsheet for treatment today, total treatment time and time spent performing 1:1 timed codes.        Thank you for your referral.    INQUIRIES  Therapist: Emilia Jimenez [facility] [address] [address]  Phone: [phone]  Fax: [phone]

## 2023-02-03 NOTE — PROGRESS NOTES
Subjective:  HPI  Physical Exam                    Objective:  System    Physical Exam    General     ROS    Assessment/Plan:    PROGRESS  REPORT    Progress reporting period is from 12/29/2023 to 2/2/2023.       SUBJECTIVE  Subjective changes noted by patient:  Patient had to cancel last scheduled appointment due to weather and unable to be seen until today. Patient states the LBP is about the same; intermittent achiness and no leg pain.  Patient reports the neck pain has been off and on. currently has increase neck pain.  Currently has a HA.    Current pain level is 3/10 (LBP;  6/10PL neck).     Previous pain level was   3/10.   Changes in function:  Yes (See Goal flowsheet attached for changes in current functional level)  Adverse reaction to treatment or activity: None    OBJECTIVE  Changes noted in objective findings:    Posture: FWD head and rounded shoulders.    CROM: flex- no loss; some pulling back and R side of neck,  Ext-WNL; no issues.  ROT- R rot mod loss; pain.    TROM:  flex= touches toes; tightness back of legs and low back, ext= mod loss pain.     ASSESSMENT/PLAN  Updated problem list and treatment plan: Diagnosis 1:  Chronic neck and low back pain  Pain -  hot/cold therapy, US and manual therapy  Decreased ROM/flexibility - manual therapy and therapeutic exercise  Impaired muscle performance - neuro re-education  Decreased function - therapeutic activities  Impaired posture - neuro re-education  STG/LTGs have been met or progress has been made towards goals:  Yes (See Goal flow sheet completed today.)  Assessment of Progress: The patient's condition is improving.  The patient's condition has potential to improve.  Self Management Plans:  Patient has been instructed in a home treatment program.  I have re-evaluated this patient and find that the nature, scope, duration and intensity of the therapy is appropriate for the medical condition of the patient.  Kaila continues to require the following  intervention to meet STG and LTG's:  PT    Recommendations:  This patient would benefit from continued therapy.     Frequency:  2 X a month, once daily  Duration:  for 1 months        Please refer to the daily flowsheet for treatment today, total treatment time and time spent performing 1:1 timed codes.

## 2023-05-08 PROBLEM — M54.50 CHRONIC MIDLINE LOW BACK PAIN WITHOUT SCIATICA: Status: RESOLVED | Noted: 2022-10-06 | Resolved: 2023-05-08

## 2023-05-08 PROBLEM — M54.2 NECK PAIN: Status: RESOLVED | Noted: 2022-06-16 | Resolved: 2023-05-08

## 2023-05-08 PROBLEM — G89.29 CHRONIC MIDLINE LOW BACK PAIN WITHOUT SCIATICA: Status: RESOLVED | Noted: 2022-10-06 | Resolved: 2023-05-08

## 2023-05-08 NOTE — PROGRESS NOTES
Patient did not return for further treatment and no additional progress was noted.  Please refer to the progress note and goal flowsheet completed on 02/02/23 for discharge information.

## 2023-06-02 ENCOUNTER — HEALTH MAINTENANCE LETTER (OUTPATIENT)
Age: 68
End: 2023-06-02

## 2023-12-03 ENCOUNTER — HEALTH MAINTENANCE LETTER (OUTPATIENT)
Age: 68
End: 2023-12-03

## 2024-03-20 ENCOUNTER — TRANSCRIBE ORDERS (OUTPATIENT)
Dept: RADIATION ONCOLOGY | Facility: CLINIC | Age: 69
End: 2024-03-20
Payer: COMMERCIAL

## 2024-03-20 DIAGNOSIS — M43.16 SPONDYLOLISTHESIS, LUMBAR REGION: Primary | ICD-10-CM

## 2024-03-26 ENCOUNTER — THERAPY VISIT (OUTPATIENT)
Dept: PHYSICAL THERAPY | Facility: CLINIC | Age: 69
End: 2024-03-26
Attending: NEUROLOGICAL SURGERY
Payer: COMMERCIAL

## 2024-03-26 DIAGNOSIS — M79.662 PAIN OF LEFT LOWER LEG: Primary | ICD-10-CM

## 2024-03-26 DIAGNOSIS — M43.16 SPONDYLOLISTHESIS, LUMBAR REGION: ICD-10-CM

## 2024-03-26 PROBLEM — M54.50 CHRONIC LEFT-SIDED LOW BACK PAIN WITHOUT SCIATICA: Chronic | Status: ACTIVE | Noted: 2022-10-06

## 2024-03-26 PROBLEM — G89.29 CHRONIC LEFT-SIDED LOW BACK PAIN WITHOUT SCIATICA: Chronic | Status: ACTIVE | Noted: 2022-10-06

## 2024-03-26 PROBLEM — M54.50 CHRONIC LEFT-SIDED LOW BACK PAIN WITHOUT SCIATICA: Status: ACTIVE | Noted: 2022-10-06

## 2024-03-26 PROBLEM — G89.29 CHRONIC LEFT-SIDED LOW BACK PAIN WITHOUT SCIATICA: Status: ACTIVE | Noted: 2022-10-06

## 2024-03-26 PROCEDURE — 97161 PT EVAL LOW COMPLEX 20 MIN: CPT | Mod: GP | Performed by: PHYSICAL THERAPIST

## 2024-03-26 PROCEDURE — 97110 THERAPEUTIC EXERCISES: CPT | Mod: GP | Performed by: PHYSICAL THERAPIST

## 2024-03-26 NOTE — PROGRESS NOTES
PHYSICAL THERAPY EVALUATION  Type of Visit: Evaluation    See electronic medical record for Abuse and Falls Screening details.    Subjective       Presenting condition or subjective complaint: L hip pain- DOS: 11/13/2023- s/p POSTERIOR L4-S1 DECOMPRESSION AND FUSION WITH PELVIC FIXATION AND SI JOINT FUSION .  Patient stated after her hospital stay she was discharged home with a brace and received Home Health Care (Nurse, PT and OT).  Date of onset: 03/20/24 (MD order)    Relevant medical history: Diabetes   Dates & types of surgery: Back surgery Nov 2023    Prior diagnostic imaging/testing results: X-ray (per pt: all hardware is intact)     Prior therapy history for the same diagnosis, illness or injury: No      Prior Level of Function  Transfers: Independent  Ambulation: Independent, Assistive equipment  ADL: Independent, Assistive equipment  IADL: Meal preparation    Living Environment  Social support: With a significant other or spouse   Type of home: House; 2-story (split entry)   Stairs to enter the home: No       Ramp: No   Stairs inside the home: Yes 7 (7 stairs up or 7 stairs down) Is there a railing: Yes   Help at home: None; Self Cares (home health aide/personal care attendant, family, etc)  Equipment owned: Straight Cane; Walker with wheels; Bath bench     Employment: No    Hobbies/Interests:      Patient goals for therapy: be able to sit and walk with less pain or no pain    Pain assessment: See objective evaluation for additional pain details     Objective   LUMBAR SPINE EVALUATION  PAIN: Pain Level at Rest: 3/10  Pain Level with Use: 8/10  Pain Location: L buttock and outer hip area  Pain Quality: Aching and Burning  Pain Frequency: constant or daily  Pain is Worst: daytime  Pain is Exacerbated By: standing > 5 min unsupported; sitting > 60'    POSTURE: Standing Posture: Rounded shoulders, Forward head, Lordosis decreased, Thoracic kyphosis increased  GAIT:   Weightbearing Status: WBAT  Assistive  Device(s): Walker (front wheeled)  Gait Deviations: Antalgic  Base of support increased  Stance time decreased  Stride length decreased  Trunk flexion, Trunk lean R, Decreased pelvic rotation    ROM:   (Degrees) Left AROM Left PROM  Right AROM Right PROM   Hip Flexion  WNL     Hip Extension       Hip Abduction  WNL     Hip Adduction       Hip Internal Rotation       Hip External Rotation       Knee Flexion  WNL     Knee Extension  WNL     Lumbar Side glide     Lumbar Flexion Mid thigh   Lumbar Extension Unable to reach neutral spine       MYOTOMES: WNL    DERMATOMES: WNL  NEURAL TENSION:   FLEXIBILITY:   LUMBAR/HIP Special Tests:    PELVIS/SI SPECIAL TESTS:   FUNCTIONAL TESTS:   PALPATION:   + Tenderness At Location Left Right   Quadratus Lumborum + +   Erector Spinae + +   Piriformis  + +   PSIS     ASIS     Iliac Crest +    Glut Medius     Greater Trochanter +    Ischial Tuberosity     Hamstrings     Hip Flexors     Vertebral          Assessment & Plan   CLINICAL IMPRESSIONS  Medical Diagnosis: Spondylolisthesis, lumbar region    Treatment Diagnosis: Back/L leg pain   Impression/Assessment: Patient is a 68 year old female with L buttock and L hip pain complaints.  The following significant findings have been identified: Pain, Decreased ROM/flexibility, Decreased strength, Impaired gait, Impaired muscle performance, Decreased activity tolerance, and Impaired posture. These impairments interfere with their ability to perform self care tasks, recreational activities, household chores, and household mobility as compared to previous level of function.     Clinical Decision Making (Complexity):  Clinical Presentation: Stable/Uncomplicated  Clinical Presentation Rationale: based on medical and personal factors listed in PT evaluation  Clinical Decision Making (Complexity): Low complexity    PLAN OF CARE  Treatment Interventions:  Interventions: Manual Therapy, Neuromuscular Re-education, Therapeutic Activity, Therapeutic  Exercise    Long Term Goals     PT Goal 1  Goal Identifier: standing  Goal Description: pt able to stand 30 min without support; 0-2/10PL in L leg  Rationale: to maximize safety and independence with performance of ADLs and functional tasks;to maximize safety and independence within the home;to maximize safety and independence within the community;to maximize safety and independence with self cares  Goal Progress: pt can stand 5 min without support; 10 min with use of walker or cart for support; 6-8/10 PL  Target Date: 05/21/24      Frequency of Treatment: 2x/week x 2week;  reduce to 1x/week for 6 weeks  Duration of Treatment: x8 weeks    Recommended Referrals to Other Professionals: Physical Therapy  Education Assessment:   Learner/Method: Demonstration;Pictures/Video    Risks and benefits of evaluation/treatment have been explained.   Patient/Family/caregiver agrees with Plan of Care.     Evaluation Time:     PT Eval, Low Complexity Minutes (04028): 18       Signing Clinician: Emilia Jimenez, DALIA      [facility]                                                                                   OUTPATIENT PHYSICAL THERAPY      PLAN OF TREATMENT FOR OUTPATIENT REHABILITATION   Patient's Last Name, First Name, Kaila Perkins YOB: 1955   Provider's Name   [facility]   Medical Record No.  [id number]     Onset Date: 03/20/24 (MD order)  Start of Care Date: 03/26/24     Medical Diagnosis:  Spondylolisthesis, lumbar region      PT Treatment Diagnosis:  Back/L leg pain Plan of Treatment  Frequency/Duration: 2x/week x 2week;  reduce to 1x/week for 6 weeks/ x8 weeks    Certification date from 03/26/24 to 05/21/24         See note for plan of treatment details and functional goals     Emilia Jimenez, PT                         I CERTIFY THE NEED FOR THESE SERVICES FURNISHED UNDER        THIS PLAN OF TREATMENT AND WHILE UNDER MY CARE .              Physician Signature               Date    X_____________________________________________________                  Referring Provider:  Arvind Barton    Initial Assessment  See Epic Evaluation- Start of Care Date: 03/26/24

## 2024-04-04 ENCOUNTER — THERAPY VISIT (OUTPATIENT)
Dept: PHYSICAL THERAPY | Facility: CLINIC | Age: 69
End: 2024-04-04
Payer: COMMERCIAL

## 2024-04-04 DIAGNOSIS — M54.50 CHRONIC LEFT-SIDED LOW BACK PAIN WITHOUT SCIATICA: Primary | Chronic | ICD-10-CM

## 2024-04-04 DIAGNOSIS — M79.662 PAIN OF LEFT LOWER LEG: ICD-10-CM

## 2024-04-04 DIAGNOSIS — G89.29 CHRONIC LEFT-SIDED LOW BACK PAIN WITHOUT SCIATICA: Primary | Chronic | ICD-10-CM

## 2024-04-04 PROCEDURE — 97110 THERAPEUTIC EXERCISES: CPT | Mod: GP | Performed by: PHYSICAL THERAPIST

## 2024-04-09 ENCOUNTER — THERAPY VISIT (OUTPATIENT)
Dept: PHYSICAL THERAPY | Facility: CLINIC | Age: 69
End: 2024-04-09
Payer: COMMERCIAL

## 2024-04-09 DIAGNOSIS — M54.50 CHRONIC LEFT-SIDED LOW BACK PAIN WITHOUT SCIATICA: Primary | Chronic | ICD-10-CM

## 2024-04-09 DIAGNOSIS — G89.29 CHRONIC LEFT-SIDED LOW BACK PAIN WITHOUT SCIATICA: Primary | Chronic | ICD-10-CM

## 2024-04-09 DIAGNOSIS — M79.662 PAIN OF LEFT LOWER LEG: ICD-10-CM

## 2024-04-09 PROCEDURE — 97110 THERAPEUTIC EXERCISES: CPT | Mod: GP | Performed by: PHYSICAL THERAPIST

## 2024-04-09 PROCEDURE — 97140 MANUAL THERAPY 1/> REGIONS: CPT | Mod: GP | Performed by: PHYSICAL THERAPIST

## 2024-04-11 ENCOUNTER — THERAPY VISIT (OUTPATIENT)
Dept: PHYSICAL THERAPY | Facility: CLINIC | Age: 69
End: 2024-04-11
Payer: COMMERCIAL

## 2024-04-11 DIAGNOSIS — G89.29 CHRONIC LEFT-SIDED LOW BACK PAIN WITHOUT SCIATICA: Primary | Chronic | ICD-10-CM

## 2024-04-11 DIAGNOSIS — M79.662 PAIN OF LEFT LOWER LEG: ICD-10-CM

## 2024-04-11 DIAGNOSIS — M54.50 CHRONIC LEFT-SIDED LOW BACK PAIN WITHOUT SCIATICA: Primary | Chronic | ICD-10-CM

## 2024-04-11 PROCEDURE — 97110 THERAPEUTIC EXERCISES: CPT | Mod: GP | Performed by: PHYSICAL THERAPIST

## 2024-04-11 PROCEDURE — 97140 MANUAL THERAPY 1/> REGIONS: CPT | Mod: GP | Performed by: PHYSICAL THERAPIST

## 2024-04-18 ENCOUNTER — THERAPY VISIT (OUTPATIENT)
Dept: PHYSICAL THERAPY | Facility: CLINIC | Age: 69
End: 2024-04-18
Payer: COMMERCIAL

## 2024-04-18 DIAGNOSIS — G89.29 CHRONIC LEFT-SIDED LOW BACK PAIN WITHOUT SCIATICA: Primary | Chronic | ICD-10-CM

## 2024-04-18 DIAGNOSIS — M54.50 CHRONIC LEFT-SIDED LOW BACK PAIN WITHOUT SCIATICA: Primary | Chronic | ICD-10-CM

## 2024-04-18 DIAGNOSIS — M79.662 PAIN OF LEFT LOWER LEG: ICD-10-CM

## 2024-04-18 PROCEDURE — 97140 MANUAL THERAPY 1/> REGIONS: CPT | Mod: GP | Performed by: PHYSICAL THERAPIST

## 2024-04-18 PROCEDURE — 97110 THERAPEUTIC EXERCISES: CPT | Mod: GP | Performed by: PHYSICAL THERAPIST

## 2024-04-25 ENCOUNTER — THERAPY VISIT (OUTPATIENT)
Dept: PHYSICAL THERAPY | Facility: CLINIC | Age: 69
End: 2024-04-25
Payer: COMMERCIAL

## 2024-04-25 DIAGNOSIS — M54.50 CHRONIC LEFT-SIDED LOW BACK PAIN WITHOUT SCIATICA: Primary | Chronic | ICD-10-CM

## 2024-04-25 DIAGNOSIS — G89.29 CHRONIC LEFT-SIDED LOW BACK PAIN WITHOUT SCIATICA: Primary | Chronic | ICD-10-CM

## 2024-04-25 DIAGNOSIS — M79.662 PAIN OF LEFT LOWER LEG: ICD-10-CM

## 2024-04-25 PROCEDURE — 97110 THERAPEUTIC EXERCISES: CPT | Mod: GP | Performed by: PHYSICAL THERAPIST

## 2024-04-25 PROCEDURE — 97140 MANUAL THERAPY 1/> REGIONS: CPT | Mod: GP | Performed by: PHYSICAL THERAPIST

## 2024-05-02 ENCOUNTER — THERAPY VISIT (OUTPATIENT)
Dept: PHYSICAL THERAPY | Facility: CLINIC | Age: 69
End: 2024-05-02
Payer: COMMERCIAL

## 2024-05-02 DIAGNOSIS — M79.662 PAIN OF LEFT LOWER LEG: ICD-10-CM

## 2024-05-02 DIAGNOSIS — M54.50 CHRONIC LEFT-SIDED LOW BACK PAIN WITHOUT SCIATICA: Primary | Chronic | ICD-10-CM

## 2024-05-02 DIAGNOSIS — G89.29 CHRONIC LEFT-SIDED LOW BACK PAIN WITHOUT SCIATICA: Primary | Chronic | ICD-10-CM

## 2024-05-02 PROCEDURE — 97530 THERAPEUTIC ACTIVITIES: CPT | Mod: GP | Performed by: PHYSICAL THERAPIST

## 2024-05-02 PROCEDURE — 97110 THERAPEUTIC EXERCISES: CPT | Mod: GP | Performed by: PHYSICAL THERAPIST

## 2024-05-16 ENCOUNTER — THERAPY VISIT (OUTPATIENT)
Dept: PHYSICAL THERAPY | Facility: CLINIC | Age: 69
End: 2024-05-16
Payer: COMMERCIAL

## 2024-05-16 DIAGNOSIS — M79.662 PAIN OF LEFT LOWER LEG: ICD-10-CM

## 2024-05-16 DIAGNOSIS — M54.50 CHRONIC LEFT-SIDED LOW BACK PAIN WITHOUT SCIATICA: Primary | Chronic | ICD-10-CM

## 2024-05-16 DIAGNOSIS — G89.29 CHRONIC LEFT-SIDED LOW BACK PAIN WITHOUT SCIATICA: Primary | Chronic | ICD-10-CM

## 2024-05-16 PROCEDURE — 97110 THERAPEUTIC EXERCISES: CPT | Mod: GP | Performed by: PHYSICAL THERAPIST

## 2024-05-16 PROCEDURE — 97140 MANUAL THERAPY 1/> REGIONS: CPT | Mod: GP | Performed by: PHYSICAL THERAPIST

## 2024-05-16 NOTE — PROGRESS NOTES
DISCHARGE  Reason for Discharge: Patient would like to continue with current HEP on her own for awhile; pt to contact MD or PT clinic if she feels she needs to return to PT    Equipment Issued: HEP and Theraband      Discharge Plan: Patient to continue home program.    Referring Provider:  Arvind Barton MD     05/16/24 0500   Appointment Info   Signing clinician's name / credentials Emilia Jimenez, MPT   Total/Authorized Visits 8   Visits Used 8   Medical Diagnosis Spondylolisthesis, lumbar region   PT Tx Diagnosis Back/L leg pain   Quick Adds Certification   Progress Note/Certification   Start of Care Date 03/26/24   Onset of illness/injury or Date of Surgery 03/20/24  (MD order)   Therapy Frequency 2x/week x 2week;  reduce to 1x/week for 6 weeks   Predicted Duration x8 weeks   Certification date from 03/26/24   Certification date to 05/21/24   Progress Note Completed Date 03/26/24   PT Goal 1   Goal Identifier standing   Goal Description pt able to stand 30 min without support; 0-2/10PL in L leg   Rationale to maximize safety and independence with performance of ADLs and functional tasks;to maximize safety and independence within the home;to maximize safety and independence within the community;to maximize safety and independence with self cares   Goal Progress x30 min; up and on her feet moving with and without support   Target Date 05/21/24   Subjective Report   Subjective Report pt has done some baking the other day; up and down with little to no back pain; noted fatigue and the need to sit down.  Pt also had a dentist appointment; lying down was okay but sitting was challenging.   Objective Measures   Objective Measures Objective Measure 4   Objective Measure 1   Objective Measure standing posture   Details with cues pt is able to stand up taller;  FHP, rounded shoulders and increase thoracic kyphosis.   Objective Measure 2   Objective Measure palpation   Details not assessed today  "  Objective Measure 3   Objective Measure LE strength   Details 4+-5/5 throughout Bilaterally; Bilaterally   Objective Measure 4   Objective Measure TROM   Details Flexion= can tough lower legs, Ext= unable to reach neutra spine   Treatment Interventions (PT)   Interventions Therapeutic Procedure/Exercise;Therapeutic Activity   Therapeutic Procedure/Exercise   Therapeutic Procedures: strength, endurance, ROM, flexibility minutes (50945) 30   Ther Proc 1 NuStep   Ther Proc 1 - Details x5'   PTRx Ther Proc 1 Short Arc Knee Extension   PTRx Ther Proc 1 - Details 15x3\" hold;  3# wt's on each leg   PTRx Ther Proc 2 Shoulder Theraband Rows   PTRx Ther Proc 2 - Details x15; GTB   PTRx Ther Proc 3 Sit to Stand   PTRx Ther Proc 3 - Details 2x10 reps; from chair in tx room   PTRx Ther Proc 4 Standing Hip Flexion   PTRx Ther Proc 4 - Details 2x10 alternate marching   PTRx Ther Proc 5 Side Stepping With Theraband   PTRx Ther Proc 5 - Details 6 step each way; repeated 3x   PTRx Ther Proc 6 Seated Piriformis Stretch   PTRx Ther Proc 6 - Details HEP   PTRx Ther Proc 8 Supine Lumbar Hip Roll   PTRx Ther Proc 8 - Details 10x to each side   PTRx Ther Proc 9 Single Knee to Chest   PTRx Ther Proc 9 - Details 2x10\" hold   PTRx Ther Proc 10 Roll Ins Hooklying   PTRx Ther Proc 10 - Details did in sitting   PTRx Ther Proc 11 Sitting Hip Adduction Squeeze   PTRx Ther Proc 11 - Details 2x10x5\" hold   Skilled Intervention progression on reps with exercises; cueing   Patient Response/Progress no increase in back/leg pain; noted some fatigue   Manual Therapy   Manual Therapy: Mobilization, MFR, MLD, friction massage minutes (38138) 10   Manual Therapy 1 STM Patricio Lumbar Paraspinals   Manual Therapy 1 - Details in R SL; x10'   Skilled Intervention manual skills   Patient Response/Progress tolerated well; reported less pain   Education   Learner/Method Demonstration;Pictures/Video   Plan   Home program pt to cont with HEP as she tolerates daily "   Plan for next session D/C'd to HEP

## 2024-06-30 ENCOUNTER — HEALTH MAINTENANCE LETTER (OUTPATIENT)
Age: 69
End: 2024-06-30

## 2025-01-05 ENCOUNTER — HEALTH MAINTENANCE LETTER (OUTPATIENT)
Age: 70
End: 2025-01-05